# Patient Record
Sex: MALE | Race: WHITE
[De-identification: names, ages, dates, MRNs, and addresses within clinical notes are randomized per-mention and may not be internally consistent; named-entity substitution may affect disease eponyms.]

---

## 2018-10-06 ENCOUNTER — HOSPITAL ENCOUNTER (EMERGENCY)
Dept: HOSPITAL 25 - UCEAST | Age: 58
Discharge: HOME | End: 2018-10-06
Payer: SELF-PAY

## 2018-10-06 VITALS — DIASTOLIC BLOOD PRESSURE: 81 MMHG | SYSTOLIC BLOOD PRESSURE: 139 MMHG

## 2018-10-06 DIAGNOSIS — J18.9: Primary | ICD-10-CM

## 2018-10-06 PROCEDURE — G0463 HOSPITAL OUTPT CLINIC VISIT: HCPCS

## 2018-10-06 PROCEDURE — 71046 X-RAY EXAM CHEST 2 VIEWS: CPT

## 2018-10-06 PROCEDURE — 99213 OFFICE O/P EST LOW 20 MIN: CPT

## 2018-10-06 NOTE — UC
Respiratory Complaint HPI





- HPI Summary


HPI Summary: 





Pt presents to  with spouse. pt with 2 weeks of cough and wheezing. Pt was 

eval [facility] - Rx z pack, prednison and tessalon pearls. pt states 

systomes mild improved. Pt continues to cough which is temporarily improved 

with Nyquil/Dayquil. Pt denies nausea, vomiting but states decreased appetite. 

Pt states with coughing has some wheeze and lungs feel tight.  No sick contact.

  no chest pain





pt's medications reviewed this visit





- History of Current Complaint


Chief Complaint: UCRespiratory


Stated Complaint: COUGH, CHEST CONGESTION


Time Seen by Provider: 10/06/18 16:57


Hx Obtained From: Patient


Onset/Duration: Gradual Onset


Pain Intensity: 0





- Allergies/Home Medications


Allergies/Adverse Reactions: 


 Allergies











Allergy/AdvReac Type Severity Reaction Status Date / Time


 


No Known Allergies Allergy   Verified 10/06/18 16:24











Home Medications: 


 Home Medications





Omeprazole CAP* [Prilosec CAP* 20 MG] 40 mg PO DAILY 10/06/18 [History 

Confirmed 10/06/18]











PMH/Surg Hx/FS Hx/Imm Hx


Previously Healthy: Yes





- Surgical History


Surgical History: None





- Family History


Known Family History: Positive: Other - non contributory





- Social History


Occupation: Employed Full-time


Lives: With Family


Alcohol Use: Weekly


Substance Use Type: None


Smoking Status (MU): Never Smoked Tobacco


Have You Smoked in the Last Year: No





Review of Systems


Constitutional: Negative


Skin: Negative


Respiratory: Cough


Cardiovascular: Negative


All Other Systems Reviewed And Are Negative: Yes





Physical Exam





- Summary


Physical Exam Summary: 





Vital Signs Reviewed: Yes


A+Ox3, no distress


Eyes: Conjunctiva Clear, MIKE. EOM intact and full


ENT: Hearing grossly normal  TM x 2 clear, mmoist, uvula midline, no exudate, 

no erythema


Neck: Positive: Supple


Respiratory: Pt with persistent coarse cough, no sputum,  No accessory muscle 

use + CTA throughout  with scattered wheeze  


Cardiovascular: RRR  nl s1, s2  no m/r  CBT <2  sec


abd soft + BS nt/nd  no guarding, no distension


Musculoskeletal Exam: LOYA x 4 without difficulty Strength Intact, ROM Intact


Neurological: Positive: Alert,  + sensation throughout


Psychological: Positive: Normal Response To Family


Skin: Positive: no rash, no ecchymosis


Triage Information Reviewed: Yes


Vital Signs: 


 Initial Vital Signs











Temp  98.5 F   10/06/18 16:19


 


Pulse  82   10/06/18 16:19


 


Resp  18   10/06/18 16:19


 


BP  143/85   10/06/18 16:19


 


Pulse Ox  96   10/06/18 16:19














 Diagnostic Evaluation





- Laboratory


O2 Sat by Pulse Oximetry: 96





- Radiology


Xray Interpretation: Positive (See Comments) - Patient Name:         KILO LUGO                                                                   

Medical Record#: [id number]


Radiology Interpretation Completed By: Radiologist





Re-Evaluation





- Re-Evaluation


  ** First Eval


Change: Improved - Pt improved following neb less cough, wheezing improved  

reviewed CXR with pt  davis tart abx long taper pred return precuations pcp f/u 

secretion precaution





Respiratory Course/Dx





- Course


Course Of Treatment: Pt presents with persistent cough for 2-3 weeks, Pt was 

treated with course of abx and pred with little relief.  VSS.  Pt with 

persistent cough and wheeze.  Will check CXR.  duoneb and reassess





- Differential Dx/Diagnosis


Provider Diagnoses: LLL Pna





Discharge





- Sign-Out/Discharge


Documenting (check all that apply): Patient Departure


All imaging exams completed and their final reports reviewed: Yes





- Discharge Plan


Condition: Stable


Disposition: HOME


Prescriptions: 


Albuterol HFA INHALER* [Ventolin HFA Inhaler*] 2 puff INH Q4H PRN #1 mdi


 PRN Reason: Cough


DOXYcycline CAP(*) [DOXYcycline 100MG CAP(*)] 100 mg PO BID #20 cap


Inhaler, Assist Devices [Aerochamber Mv] 1 each PO Q4HR #1 spacer


predniSONE TAB* [Deltasone 20 MG TAB*] 20 mg PO DAILY #9 tab


Patient Education Materials:  Community Acquired Pneumonia (ED)


Referrals: 


Stephane Prado MD [Primary Care Provider] - 


Additional Instructions: 


- Take antibiotics exactly as prescribed until gone


- Use your albuterol puffer - 2 puffs ever 4 hours for the next 3 days - then 

as needed


- Take prednisone as prescribed until gone


- Stay well hydrated - avoid excess caffeine and all alcohol


- Resume taking Zyrtec


- It is recommended you take Robitussin - instead of Nyquil and Dayquil for you 

cough


-- Eat regular, healthy meals


-These infections are spread by oral secretions. Do not share eating or 

drinking utensils. Frequent hand washing is important. Clean items that may get 

your secretions on them such as cell phones, ipads, computer mouse, television 

remotes.  Once you have been on antbiotics for 2 days, change your pillowcase 

and your toothbrush


- Contact your doctor to arrange a follow-up appointment at the end of this 

upcoming week. Call your doctor, return here or go to the emergency department 

with any questions or concerns





- Billing Disposition and Condition


Condition: STABLE


Disposition: Home

## 2018-10-07 NOTE — RAD
INDICATION: Persistent cough. Wheeze. Recent fever.



COMPARISON: No relevant prior exams available [place] [facility] [place].



TECHNIQUE:  Dual energy PA and routine lateral views of the chest were obtained.



REPORT: Very mild bibasilar alveolar consolidation without volume loss. Negative for

pleural effusion or pneumothorax. The heart, pulmonary vasculature, and mediastinal

contours are unremarkable. 



IMPRESSION: 

#. Very mild bibasilar alveolar opacities are suspicious for inflammatory infiltrates

given absence of volume loss to favor atelectasis.



R0